# Patient Record
Sex: FEMALE | URBAN - METROPOLITAN AREA
[De-identification: names, ages, dates, MRNs, and addresses within clinical notes are randomized per-mention and may not be internally consistent; named-entity substitution may affect disease eponyms.]

---

## 2021-10-14 ENCOUNTER — APPOINTMENT (OUTPATIENT)
Age: 41
Setting detail: DERMATOLOGY
End: 2021-10-22

## 2021-10-14 DIAGNOSIS — B36.0 PITYRIASIS VERSICOLOR: ICD-10-CM

## 2021-10-14 DIAGNOSIS — L81.4 OTHER MELANIN HYPERPIGMENTATION: ICD-10-CM

## 2021-10-14 DIAGNOSIS — L82.1 OTHER SEBORRHEIC KERATOSIS: ICD-10-CM

## 2021-10-14 PROCEDURE — OTHER PRESCRIPTION: OTHER

## 2021-10-14 PROCEDURE — OTHER TREATMENT REGIMEN: OTHER

## 2021-10-14 PROCEDURE — OTHER IN-HOUSE DISPENSING PHARMACY: OTHER

## 2021-10-14 PROCEDURE — 99203 OFFICE O/P NEW LOW 30 MIN: CPT

## 2021-10-14 PROCEDURE — OTHER MIPS QUALITY: OTHER

## 2021-10-14 PROCEDURE — OTHER COUNSELING: OTHER

## 2021-10-14 RX ORDER — FLUCONAZOLE 150 MG/1
TABLET ORAL
Qty: 12 | Refills: 0 | Status: ERX | COMMUNITY
Start: 2021-10-14

## 2021-10-14 ASSESSMENT — LOCATION DETAILED DESCRIPTION DERM
LOCATION DETAILED: NASAL DORSUM
LOCATION DETAILED: RIGHT INFERIOR CENTRAL MALAR CHEEK
LOCATION DETAILED: LEFT PROXIMAL POSTERIOR THIGH
LOCATION DETAILED: LEFT INFERIOR CENTRAL MALAR CHEEK
LOCATION DETAILED: RIGHT SUPERIOR MEDIAL UPPER BACK
LOCATION DETAILED: RIGHT PROXIMAL POSTERIOR THIGH

## 2021-10-14 ASSESSMENT — LOCATION ZONE DERM
LOCATION ZONE: LEG
LOCATION ZONE: FACE
LOCATION ZONE: TRUNK
LOCATION ZONE: NOSE

## 2021-10-14 ASSESSMENT — LOCATION SIMPLE DESCRIPTION DERM
LOCATION SIMPLE: NOSE
LOCATION SIMPLE: RIGHT CHEEK
LOCATION SIMPLE: LEFT CHEEK
LOCATION SIMPLE: RIGHT POSTERIOR THIGH
LOCATION SIMPLE: RIGHT UPPER BACK
LOCATION SIMPLE: LEFT POSTERIOR THIGH

## 2021-10-14 NOTE — PROCEDURE: IN-HOUSE DISPENSING PHARMACY
Product 5 Application Directions: Apply a pea size amount to clean, dry face at bedtime.\\nLot # [id number] Product 5 Application Directions: Apply a pea size amount to clean, dry face at bedtime.\\nLot # [id number]

## 2021-10-14 NOTE — PROCEDURE: IN-HOUSE DISPENSING PHARMACY
Product 1 Application Directions: Apply to dark spots on face qhs x 3-4 months on/3-4 months off\\nLot #[id number]

## 2021-10-14 NOTE — PROCEDURE: IN-HOUSE DISPENSING PHARMACY
Product 3 Application Directions: Apply to rash bid prn.  Do not use for more than 2 consecutive weeks.  Do not apply to face.\\n\\nLot. [id number]\\nExp. 9/28/2017 Product 3 Application Directions: Apply to rash bid prn.  Do not use for more than 2 consecutive weeks.  Do not apply to face.\\n\\nLot. [id number]\\nExp. 9/28/2017

## 2021-10-14 NOTE — HPI: DISCOLORATION
How Severe Is Your Skin Discoloration?: moderate
Additional History: Pt stated that she used an over the counter topical

## 2021-10-14 NOTE — PROCEDURE: MIPS QUALITY
Quality 226: Preventive Care And Screening: Tobacco Use: Screening And Cessation Intervention: Patient screened for tobacco use and is an ex/non-smoker
Detail Level: Zone
Quality 431: Preventive Care And Screening: Unhealthy Alcohol Use - Screening: Patient did not receive brief counseling if identified as an unhealthy alcohol user, reason not given

## 2023-02-24 NOTE — PROCEDURE: IN-HOUSE DISPENSING PHARMACY
Health Maintenance Due   Topic Date Due   • Hepatitis B Vaccine (1 of 3 - 3-dose series) Never done   • COVID-19 Vaccine (1) Never done   • Influenza Vaccine (1) Never done       Patient is due for topics as listed above but is not proceeding with Immunization(s) COVID-19, Hep B and Influenza at this time.    Product 4 Amount/Unit (Numbers Only): 20